# Patient Record
Sex: FEMALE | Race: WHITE | NOT HISPANIC OR LATINO | Employment: UNEMPLOYED | RURAL
[De-identification: names, ages, dates, MRNs, and addresses within clinical notes are randomized per-mention and may not be internally consistent; named-entity substitution may affect disease eponyms.]

---

## 2017-01-25 ENCOUNTER — OFFICE VISIT (OUTPATIENT)
Dept: FAMILY MEDICINE CLINIC | Facility: CLINIC | Age: 22
End: 2017-01-25

## 2017-01-25 VITALS
SYSTOLIC BLOOD PRESSURE: 118 MMHG | BODY MASS INDEX: 54.97 KG/M2 | WEIGHT: 280 LBS | RESPIRATION RATE: 16 BRPM | HEART RATE: 103 BPM | DIASTOLIC BLOOD PRESSURE: 64 MMHG | TEMPERATURE: 99.3 F | HEIGHT: 60 IN

## 2017-01-25 DIAGNOSIS — E66.01 MORBID OBESITY, UNSPECIFIED OBESITY TYPE (HCC): ICD-10-CM

## 2017-01-25 DIAGNOSIS — R73.03 PREDIABETES: ICD-10-CM

## 2017-01-25 DIAGNOSIS — E88.81 METABOLIC SYNDROME: ICD-10-CM

## 2017-01-25 DIAGNOSIS — M62.830 BACK SPASM: ICD-10-CM

## 2017-01-25 DIAGNOSIS — G47.9 SLEEP DISORDER: Primary | ICD-10-CM

## 2017-01-25 DIAGNOSIS — K21.00 GASTROESOPHAGEAL REFLUX DISEASE WITH ESOPHAGITIS: ICD-10-CM

## 2017-01-25 PROCEDURE — 99213 OFFICE O/P EST LOW 20 MIN: CPT | Performed by: FAMILY MEDICINE

## 2017-01-25 RX ORDER — CYCLOBENZAPRINE HCL 5 MG
5 TABLET ORAL 3 TIMES DAILY PRN
Qty: 90 TABLET | Refills: 3 | Status: SHIPPED | OUTPATIENT
Start: 2017-01-25 | End: 2017-11-07

## 2017-01-25 NOTE — MR AVS SNAPSHOT
Serenity Perry   1/25/2017 1:30 PM   Office Visit    Dept Phone:  [phone]   Encounter #:  [id number]    Provider:  Solo Lance MD   Department:  [facility]                Your Full Care Plan              Today's Medication Changes          These changes are accurate as of: 1/25/17  1:46 PM.  If you have any questions, ask your nurse or doctor.               New Medication(s)Ordered:     cyclobenzaprine 5 MG tablet   Commonly known as:  FLEXERIL   Take 1 tablet by mouth 3 (Three) Times a Day As Needed for muscle spasms.   Started by:  Solo Lance MD            Where to Get Your Medications      These medications were sent to [facility] [address] [place] [address] [address] [place] [address] AT SEC of [facility] & [place] - [phone]  - [phone]   [address] [facility] [address]     Phone:  [phone]     cyclobenzaprine 5 MG tablet                  Your Updated Medication List          This list is accurate as of: 1/25/17  1:46 PM.  Always use your most recent med list.                acyclovir 800 MG tablet   Commonly known as:  ZOVIRAX       cyclobenzaprine 5 MG tablet   Commonly known as:  FLEXERIL   Take 1 tablet by mouth 3 (Three) Times a Day As Needed for muscle spasms.       metFORMIN 500 MG tablet   Commonly known as:  GLUCOPHAGE   TAKE 1 PILL TIWCE A DAY       mupirocin 2 % ointment   Commonly known as:  BACTROBAN   Apply  topically 2 (Two) Times a Day.       pantoprazole 40 MG EC tablet   Commonly known as:  PROTONIX   TAKE BY MOUTH ONCE A DAY       raNITIdine 300 MG tablet   Commonly known as:  ZANTAC   TAKE ONCE A DAY       triamcinolone 0.5 % ointment   Commonly known as:  KENALOG   Apply  topically 2 (Two) Times a Day.       vitamin D 55619 UNITS capsule capsule   Commonly known as:  ERGOCALCIFEROL   Take 1 capsule by mouth 1 (One) Time Per Week.               We  Performed the Following     Ambulatory Referral to Sleep Medicine       You Were Diagnosed With        Codes Comments    Sleep disorder    -  Primary ICD-10-CM: G47.9  ICD-9-CM: 780.50       Medications to be Given to You by a Medical Professional     Due       Frequency    12/12/2016 mupirocin (BACTROBAN) 2 % ointment  Every 12 Hours Scheduled      Instructions    Probiotic - make sure 5-6 cultures/strains 1,000,000,000 cultures    Food Choices for Gastroesophageal Reflux Disease, Adult    Sumeet Bonner MD - EAT FAT GET THIN  10 day detox diet   When you have gastroesophageal reflux disease (GERD), the foods you eat and your eating habits are very important. Choosing the right foods can help ease your discomfort.   WHAT GUIDELINES DO I NEED TO FOLLOW?   · Choose fruits, vegetables, whole grains, and low-fat dairy products.    · Choose low-fat meat, fish, and poultry.  · Limit fats such as oils, salad dressings, butter, nuts, and avocado.    · Keep a food diary. This helps you identify foods that cause symptoms.    · Avoid foods that cause symptoms. These may be different for everyone.    · Eat small meals often instead of 3 large meals a day.    · Eat your meals slowly, in a place where you are relaxed.    · Limit fried foods.    · Cook foods using methods other than frying.    · Avoid drinking alcohol.    · Avoid drinking large amounts of liquids with your meals.    · Avoid bending over or lying down until 2-3 hours after eating.    WHAT FOODS ARE NOT RECOMMENDED?   These are some foods and drinks that may make your symptoms worse:  Vegetables  Tomatoes. Tomato juice. Tomato and spaghetti sauce. Chili peppers. Onion and garlic. Horseradish.  Fruits  Oranges, grapefruit, and lemon (fruit and juice).  Meats  High-fat meats, fish, and poultry. This includes hot dogs, ribs, ham, sausage, salami, and palacio.  Dairy  Whole milk and chocolate milk. Sour cream. Cream. Butter. Ice cream. Cream cheese.   Drinks  Coffee and tea.  Bubbly (carbonated) drinks or energy drinks.  Condiments  Hot sauce. Barbecue sauce.   Sweets/Desserts  Chocolate and cocoa. Donuts. Peppermint and spearmint.  Fats and Oils  High-fat foods. This includes French fries and potato chips.  Other  Vinegar. Strong spices. This includes black pepper, white pepper, red pepper, cayenne, barrett powder, cloves, ginger, and chili powder.  The items listed above may not be a complete list of foods and drinks to avoid. Contact your dietitian for more information.     This information is not intended to replace advice given to you by your health care provider. Make sure you discuss any questions you have with your health care provider.     Document Released: 06/18/2013 Document Revised: 01/08/2016 Document Reviewed: 10/22/2014  [employer] Interactive Patient Education ©2016 [employer] Inc.    Gastritis, Adult  Gastritis is soreness and swelling (inflammation) of the lining of the stomach. Gastritis can develop as a sudden onset (acute) or long-term (chronic) condition. If gastritis is not treated, it can lead to stomach bleeding and ulcers.  CAUSES   Gastritis occurs when the stomach lining is weak or damaged. Digestive juices from the stomach then inflame the weakened stomach lining. The stomach lining may be weak or damaged due to viral or bacterial infections. One common bacterial infection is the Helicobacter pylori infection. Gastritis can also result from excessive alcohol consumption, taking certain medicines, or having too much acid in the stomach.   SYMPTOMS   In some cases, there are no symptoms. When symptoms are present, they may include:  · Pain or a burning sensation in the upper abdomen.  · Nausea.  · Vomiting.  · An uncomfortable feeling of fullness after eating.  DIAGNOSIS   Your caregiver may suspect you have gastritis based on your symptoms and a physical exam. To determine the cause of your gastritis, your caregiver may perform the following:  · Blood or stool  tests to check for the H pylori bacterium.  · Gastroscopy. A thin, flexible tube (endoscope) is passed down the esophagus and into the stomach. The endoscope has a light and camera on the end. Your caregiver uses the endoscope to view the inside of the stomach.  · Taking a tissue sample (biopsy) from the stomach to examine under a microscope.  TREATMENT   Depending on the cause of your gastritis, medicines may be prescribed. If you have a bacterial infection, such as an H pylori infection, antibiotics may be given. If your gastritis is caused by too much acid in the stomach, H2 blockers or antacids may be given. Your caregiver may recommend that you stop taking aspirin, ibuprofen, or other nonsteroidal anti-inflammatory drugs (NSAIDs).  HOME CARE INSTRUCTIONS  · Only take over-the-counter or prescription medicines as directed by your caregiver.  · If you were given antibiotic medicines, take them as directed. Finish them even if you start to feel better.  · Drink enough fluids to keep your urine clear or pale yellow.  · Avoid foods and drinks that make your symptoms worse, such as:    Caffeine or alcoholic drinks.    Chocolate.    Peppermint or mint flavorings.    Garlic and onions.    Spicy foods.    Citrus fruits, such as oranges, mera, or limes.    Tomato-based foods such as sauce, chili, salsa, and pizza.    Fried and fatty foods.  · Eat small, frequent meals instead of large meals.  SEEK IMMEDIATE MEDICAL CARE IF:   · You have black or dark red stools.  · You vomit blood or material that looks like coffee grounds.  · You are unable to keep fluids down.  · Your abdominal pain gets worse.  · You have a fever.  · You do not feel better after 1 week.  · You have any other questions or concerns.  MAKE SURE YOU:  · Understand these instructions.  · Will watch your condition.  · Will get help right away if you are not doing well or get worse.     This information is not intended to replace advice given to you by  your health care provider. Make sure you discuss any questions you have with your health care provider.     Document Released: 12/12/2002 Document Revised: 06/18/2013 Document Reviewed: 01/30/2013  [employer] Interactive Patient Education ©2016 [employer]  Cyclobenzaprine tablets  What is this medicine?  CYCLOBENZAPRINE (sye kloe JUSTINO za preen) is a muscle relaxer. It is used to treat muscle pain, spasms, and stiffness.  This medicine may be used for other purposes; ask your health care provider or pharmacist if you have questions.  What should I tell my health care provider before I take this medicine?  They need to know if you have any of these conditions:  -heart disease, irregular heartbeat, or previous heart attack  -liver disease  -thyroid problem  -an unusual or allergic reaction to cyclobenzaprine, tricyclic antidepressants, lactose, other medicines, foods, dyes, or preservatives  -pregnant or trying to get pregnant  -breast-feeding  How should I use this medicine?  Take this medicine by mouth with a glass of water. Follow the directions on the prescription label. If this medicine upsets your stomach, take it with food or milk. Take your medicine at regular intervals. Do not take it more often than directed.  Talk to your pediatrician regarding the use of this medicine in children. Special care may be needed.  Overdosage: If you think you have taken too much of this medicine contact a poison control center or emergency room at once.  NOTE: This medicine is only for you. Do not share this medicine with others.  What if I miss a dose?  If you miss a dose, take it as soon as you can. If it is almost time for your next dose, take only that dose. Do not take double or extra doses.  What may interact with this medicine?  Do not take this medicine with any of the following medications:  -certain medicines for fungal infections like fluconazole, itraconazole, ketoconazole, posaconazole,  voriconazole  -cisapride  -dofetilide  -dronedarone  -droperidol  -flecainide  -grepafloxacin  -halofantrine  -levomethadyl  -MAOIs like Carbex, Eldepryl, Marplan, Nardil, and Parnate  -nilotinib  -pimozide  -probucol  -sertindole  -thioridazine  -ziprasidone  This medicine may also interact with the following medications:  -abarelix  -alcohol  -certain medicines for cancer  -certain medicines for depression, anxiety, or psychotic disturbances  -certain medicines for infection like alfuzosin, chloroquine, clarithromycin, levofloxacin, mefloquine, pentamidine, troleandomycin  -certain medicines for an irregular heart beat  -certain medicines used for sleep or numbness during surgery or procedure  -contrast dyes  -dolasetron  -guanethidine  -methadone  -octreotide  -ondansetron  -other medicines that prolong the QT interval (cause an abnormal heart rhythm)  -palonosetron  -phenothiazines like chlorpromazine, mesoridazine, prochlorperazine, thioridazine  -tramadol  -vardenafil  This list may not describe all possible interactions. Give your health care provider a list of all the medicines, herbs, non-prescription drugs, or dietary supplements you use. Also tell them if you smoke, drink alcohol, or use illegal drugs. Some items may interact with your medicine.  What should I watch for while using this medicine?  Check with your doctor or health care professional if your condition does not improve within 1 to 3 weeks.  You may get drowsy or dizzy when you first start taking the medicine or change doses. Do not drive, use machinery, or do anything that may be dangerous until you know how the medicine affects you. Stand or sit up slowly.  Your mouth may get dry. Drinking water, chewing sugarless gum, or sucking on hard candy may help.  What side effects may I notice from receiving this medicine?  Side effects that you should report to your doctor or health care professional as soon as possible:  -allergic reactions like  skin rash, itching or hives, swelling of the face, lips, or tongue  -chest pain  -fast heartbeat  -hallucinations  -seizures  -vomiting  Side effects that usually do not require medical attention (report to your doctor or health care professional if they continue or are bothersome):  -headache  This list may not describe all possible side effects. Call your doctor for medical advice about side effects. You may report side effects to FDA at 9-417-MCW-7574.  Where should I keep my medicine?  Keep out of the reach of children.  Store at room temperature between 15 and 30 degrees C (59 and 86 degrees F). Keep container tightly closed. Throw away any unused medicine after the expiration date.  NOTE: This sheet is a summary. It may not cover all possible information. If you have questions about this medicine, talk to your doctor, pharmacist, or health care provider.     © 2016, Elsevier/[employer]. (2014-07-15 12:48:19)       Patient Instructions History      Upcoming Appointments     Visit Type Date Time Department    OFFICE VISIT 1/25/2017  1:30 PM [facility]    OFFICE VISIT 3/13/2017 11:00 AM [facility]      [employer] Signup     Our records indicate that you have declined [employer] signup. If you would like to sign up for [employer], please email [email or web address] or call [phone] to obtain an activation code.             Other Info from Your Visit           Your Appointments     Mar 13, 2017 11:00 AM CDT   Office Visit with Solo Lance MD   [facility] (--)    [place] [address] [address] [id number]  [facility] [id number] [phone]           Arrive 15 minutes prior to appointment.              Allergies     No Known Allergies      Reason for Visit     Sleep Apnea           Vital Signs     Blood Pressure Pulse Temperature Respirations Height Weight    118/64 103 99.3 °F (37.4 °C) 16  "60\" (152.4 cm) 280 lb (127 kg)    Body Mass Index Smoking Status                54.68 kg/m2 Never Smoker          Problems and Diagnoses Noted     Sleep disorder    -  Primary        "

## 2017-01-25 NOTE — PATIENT INSTRUCTIONS
Probiotic - make sure 5-6 cultures/strains 1,000,000,000 cultures    Food Choices for Gastroesophageal Reflux Disease, Adult    Sumeet Bonner MD - EAT FAT GET THIN  10 day detox diet   When you have gastroesophageal reflux disease (GERD), the foods you eat and your eating habits are very important. Choosing the right foods can help ease your discomfort.   WHAT GUIDELINES DO I NEED TO FOLLOW?   · Choose fruits, vegetables, whole grains, and low-fat dairy products.    · Choose low-fat meat, fish, and poultry.  · Limit fats such as oils, salad dressings, butter, nuts, and avocado.    · Keep a food diary. This helps you identify foods that cause symptoms.    · Avoid foods that cause symptoms. These may be different for everyone.    · Eat small meals often instead of 3 large meals a day.    · Eat your meals slowly, in a place where you are relaxed.    · Limit fried foods.    · Cook foods using methods other than frying.    · Avoid drinking alcohol.    · Avoid drinking large amounts of liquids with your meals.    · Avoid bending over or lying down until 2-3 hours after eating.    WHAT FOODS ARE NOT RECOMMENDED?   These are some foods and drinks that may make your symptoms worse:  Vegetables  Tomatoes. Tomato juice. Tomato and spaghetti sauce. Chili peppers. Onion and garlic. Horseradish.  Fruits  Oranges, grapefruit, and lemon (fruit and juice).  Meats  High-fat meats, fish, and poultry. This includes hot dogs, ribs, ham, sausage, salami, and palacio.  Dairy  Whole milk and chocolate milk. Sour cream. Cream. Butter. Ice cream. Cream cheese.   Drinks  Coffee and tea. Bubbly (carbonated) drinks or energy drinks.  Condiments  Hot sauce. Barbecue sauce.   Sweets/Desserts  Chocolate and cocoa. Donuts. Peppermint and spearmint.  Fats and Oils  High-fat foods. This includes French fries and potato chips.  Other  Vinegar. Strong spices. This includes black pepper, white pepper, red pepper, cayenne, barrett powder, cloves, ginger, and  chili powder.  The items listed above may not be a complete list of foods and drinks to avoid. Contact your dietitian for more information.     This information is not intended to replace advice given to you by your health care provider. Make sure you discuss any questions you have with your health care provider.     Document Released: 06/18/2013 Document Revised: 01/08/2016 Document Reviewed: 10/22/2014  [employer] Interactive Patient Education ©2016 [employer].    Gastritis, Adult  Gastritis is soreness and swelling (inflammation) of the lining of the stomach. Gastritis can develop as a sudden onset (acute) or long-term (chronic) condition. If gastritis is not treated, it can lead to stomach bleeding and ulcers.  CAUSES   Gastritis occurs when the stomach lining is weak or damaged. Digestive juices from the stomach then inflame the weakened stomach lining. The stomach lining may be weak or damaged due to viral or bacterial infections. One common bacterial infection is the Helicobacter pylori infection. Gastritis can also result from excessive alcohol consumption, taking certain medicines, or having too much acid in the stomach.   SYMPTOMS   In some cases, there are no symptoms. When symptoms are present, they may include:  · Pain or a burning sensation in the upper abdomen.  · Nausea.  · Vomiting.  · An uncomfortable feeling of fullness after eating.  DIAGNOSIS   Your caregiver may suspect you have gastritis based on your symptoms and a physical exam. To determine the cause of your gastritis, your caregiver may perform the following:  · Blood or stool tests to check for the H pylori bacterium.  · Gastroscopy. A thin, flexible tube (endoscope) is passed down the esophagus and into the stomach. The endoscope has a light and camera on the end. Your caregiver uses the endoscope to view the inside of the stomach.  · Taking a tissue sample (biopsy) from the stomach to examine under a microscope.  TREATMENT   Depending  on the cause of your gastritis, medicines may be prescribed. If you have a bacterial infection, such as an H pylori infection, antibiotics may be given. If your gastritis is caused by too much acid in the stomach, H2 blockers or antacids may be given. Your caregiver may recommend that you stop taking aspirin, ibuprofen, or other nonsteroidal anti-inflammatory drugs (NSAIDs).  HOME CARE INSTRUCTIONS  · Only take over-the-counter or prescription medicines as directed by your caregiver.  · If you were given antibiotic medicines, take them as directed. Finish them even if you start to feel better.  · Drink enough fluids to keep your urine clear or pale yellow.  · Avoid foods and drinks that make your symptoms worse, such as:    Caffeine or alcoholic drinks.    Chocolate.    Peppermint or mint flavorings.    Garlic and onions.    Spicy foods.    Citrus fruits, such as oranges, mera, or limes.    Tomato-based foods such as sauce, chili, salsa, and pizza.    Fried and fatty foods.  · Eat small, frequent meals instead of large meals.  SEEK IMMEDIATE MEDICAL CARE IF:   · You have black or dark red stools.  · You vomit blood or material that looks like coffee grounds.  · You are unable to keep fluids down.  · Your abdominal pain gets worse.  · You have a fever.  · You do not feel better after 1 week.  · You have any other questions or concerns.  MAKE SURE YOU:  · Understand these instructions.  · Will watch your condition.  · Will get help right away if you are not doing well or get worse.     This information is not intended to replace advice given to you by your health care provider. Make sure you discuss any questions you have with your health care provider.     Document Released: 12/12/2002 Document Revised: 06/18/2013 Document Reviewed: 01/30/2013  [employer] Interactive Patient Education ©2016 [employer]  Cyclobenzaprine tablets  What is this medicine?  CYCLOBENZAPRINE (sye kloe JUSTINO za preen) is a muscle relaxer. It  is used to treat muscle pain, spasms, and stiffness.  This medicine may be used for other purposes; ask your health care provider or pharmacist if you have questions.  What should I tell my health care provider before I take this medicine?  They need to know if you have any of these conditions:  -heart disease, irregular heartbeat, or previous heart attack  -liver disease  -thyroid problem  -an unusual or allergic reaction to cyclobenzaprine, tricyclic antidepressants, lactose, other medicines, foods, dyes, or preservatives  -pregnant or trying to get pregnant  -breast-feeding  How should I use this medicine?  Take this medicine by mouth with a glass of water. Follow the directions on the prescription label. If this medicine upsets your stomach, take it with food or milk. Take your medicine at regular intervals. Do not take it more often than directed.  Talk to your pediatrician regarding the use of this medicine in children. Special care may be needed.  Overdosage: If you think you have taken too much of this medicine contact a poison control center or emergency room at once.  NOTE: This medicine is only for you. Do not share this medicine with others.  What if I miss a dose?  If you miss a dose, take it as soon as you can. If it is almost time for your next dose, take only that dose. Do not take double or extra doses.  What may interact with this medicine?  Do not take this medicine with any of the following medications:  -certain medicines for fungal infections like fluconazole, itraconazole, ketoconazole, posaconazole, voriconazole  -cisapride  -dofetilide  -dronedarone  -droperidol  -flecainide  -grepafloxacin  -halofantrine  -levomethadyl  -MAOIs like Carbex, Eldepryl, Marplan, Nardil, and Parnate  -nilotinib  -pimozide  -probucol  -sertindole  -thioridazine  -ziprasidone  This medicine may also interact with the following medications:  -abarelix  -alcohol  -certain medicines for cancer  -certain medicines for  depression, anxiety, or psychotic disturbances  -certain medicines for infection like alfuzosin, chloroquine, clarithromycin, levofloxacin, mefloquine, pentamidine, troleandomycin  -certain medicines for an irregular heart beat  -certain medicines used for sleep or numbness during surgery or procedure  -contrast dyes  -dolasetron  -guanethidine  -methadone  -octreotide  -ondansetron  -other medicines that prolong the QT interval (cause an abnormal heart rhythm)  -palonosetron  -phenothiazines like chlorpromazine, mesoridazine, prochlorperazine, thioridazine  -tramadol  -vardenafil  This list may not describe all possible interactions. Give your health care provider a list of all the medicines, herbs, non-prescription drugs, or dietary supplements you use. Also tell them if you smoke, drink alcohol, or use illegal drugs. Some items may interact with your medicine.  What should I watch for while using this medicine?  Check with your doctor or health care professional if your condition does not improve within 1 to 3 weeks.  You may get drowsy or dizzy when you first start taking the medicine or change doses. Do not drive, use machinery, or do anything that may be dangerous until you know how the medicine affects you. Stand or sit up slowly.  Your mouth may get dry. Drinking water, chewing sugarless gum, or sucking on hard candy may help.  What side effects may I notice from receiving this medicine?  Side effects that you should report to your doctor or health care professional as soon as possible:  -allergic reactions like skin rash, itching or hives, swelling of the face, lips, or tongue  -chest pain  -fast heartbeat  -hallucinations  -seizures  -vomiting  Side effects that usually do not require medical attention (report to your doctor or health care professional if they continue or are bothersome):  -headache  This list may not describe all possible side effects. Call your doctor for medical advice about side  effects. You may report side effects to FDA at 4-543-QYS-5574.  Where should I keep my medicine?  Keep out of the reach of children.  Store at room temperature between 15 and 30 degrees C (59 and 86 degrees F). Keep container tightly closed. Throw away any unused medicine after the expiration date.  NOTE: This sheet is a summary. It may not cover all possible information. If you have questions about this medicine, talk to your doctor, pharmacist, or health care provider.     © 2016, [employer]vier/[employer]. (2014-07-15 12:48:19)

## 2017-01-25 NOTE — PROGRESS NOTES
"Subjective   Serenity Perry is a 21 y.o. female.     History of Present Illness     Problem List  1. Morbid obesity  2. Prediabetes  3. Pruritic Rash on abdomen and arms  4. Vitamin D deficiency  5. Hyperinsular obesity  6. GERD  7. Esophagitis   8. Anxiety     Pt is 22 yo WF who is here for folllowup. Today is concerned about possible obstructive sleep apnea.  Pt is requesting to get a sleep study done. Grandmother was recently diagnosed with sleep apnea.  Pt  States she often wakes up fatigued and not getting a good night rest    Has been having pain in right upper back for about a week now. Has some muscle spasms and tenderness. Denies any chest pain but does hurt sometimes in back when she breaths.  No fever , coughing.  Pain about 3/10 on severity    Also on Metformin for prediabetes and hyperinsular obesity. Has difficult time taking medication on time and as scheduled    Regarding esophagitis. Is currently on protonix and it does help.  Is wanting information on foods to help ease reflux and esophagitis     The following portions of the patient's history were reviewed and updated as appropriate: allergies, current medications, past family history, past medical history, past social history, past surgical history and problem list.    Review of Systems   Constitutional: Negative.    HENT: Negative.    Eyes: Negative.    Respiratory: Negative.    Cardiovascular: Negative.    Gastrointestinal: Positive for abdominal pain.   Endocrine: Negative.    Genitourinary: Negative.    Musculoskeletal: Positive for back pain.   Skin: Negative.    Allergic/Immunologic: Negative.    Neurological: Negative.    Hematological: Negative.    Psychiatric/Behavioral: Positive for sleep disturbance.       Objective    Visit Vitals   • /64   • Pulse 103   • Temp 99.3 °F (37.4 °C)   • Resp 16   • Ht 60\" (152.4 cm)   • Wt 280 lb (127 kg)   • BMI 54.68 kg/m2         Chemistry        Component Value Date/Time     " 10/26/2016 0947    K 4.7 10/26/2016 0947    CL 99 10/26/2016 0947    CO2 33 (H) 10/26/2016 0947    BUN 15 10/26/2016 0947    CREATININE 0.7 10/26/2016 0947        Component Value Date/Time    CALCIUM 9.2 10/26/2016 0947    ALKPHOS 61 10/26/2016 0947    AST 24 10/26/2016 0947    ALT 32 10/26/2016 0947    BILITOT 0.4 10/26/2016 0947        Lab Results   Component Value Date    WBC 9.3 10/26/2016    HGB 13.9 10/26/2016    HCT 41.3 10/26/2016    MCV 90.6 10/26/2016     10/26/2016     Lab Results   Component Value Date    HGBA1C 5.9 (H) 10/26/2016     No results found for: CHOL  Lab Results   Component Value Date    TRIG 94 10/26/2016    TRIG 101 01/05/2016     Lab Results   Component Value Date    HDL 38 (L) 10/26/2016    HDL 34 (L) 01/05/2016     Lab Results   Component Value Date    LDLCALC 91 10/26/2016    LDLCALC 89 01/05/2016     No results found for: LDL  No results found for: HDLLDLRATIO  No components found for: CHOLHDL   Lab Results   Component Value Date    TSH 3.00 10/26/2016     Physical Exam   Constitutional: She is oriented to person, place, and time. She appears well-developed and well-nourished. No distress.   HENT:   Head: Normocephalic and atraumatic.   Right Ear: External ear normal.   Left Ear: External ear normal.   Eyes: Conjunctivae and EOM are normal. Pupils are equal, round, and reactive to light. Right eye exhibits no discharge. Left eye exhibits no discharge. No scleral icterus.   Neck: Normal range of motion. Neck supple. No JVD present. No tracheal deviation present. No thyromegaly present.   Cardiovascular: Normal rate, regular rhythm and normal heart sounds.    Pulmonary/Chest: Effort normal and breath sounds normal. No stridor. No respiratory distress. She has no wheezes.   Abdominal: Soft. Bowel sounds are normal. She exhibits no distension and no mass. There is no tenderness. There is no rebound and no guarding. No hernia.   Obese abdomen    Musculoskeletal: Normal range of  motion. She exhibits tenderness. She exhibits no edema or deformity.        Arms:  Lymphadenopathy:     She has no cervical adenopathy.   Neurological: She is alert and oriented to person, place, and time. She has normal reflexes. No cranial nerve deficit. Coordination normal.   Skin: Skin is warm and dry. No rash noted. She is not diaphoretic. No erythema. No pallor.   Psychiatric: She has a normal mood and affect. Her behavior is normal. Judgment and thought content normal.   Nursing note and vitals reviewed.      Assessment/Plan   Problems Addressed this Visit        Digestive    Gastroesophageal reflux disease with esophagitis    Morbid obesity       Musculoskeletal and Integument    Back spasm       Other    Prediabetes    Metabolic syndrome    Sleep disorder - Primary    Relevant Orders    Ambulatory Referral to Sleep Medicine        - for morbid obesity - counseled on weight loss diet and exercise  - prediabetes /hyperinsular obesity - pt to continue with metformin PO BID  - for sleep issues - will set up appt with Dr. Dykes (Pulmonologist) for sleep study. Consultation appreciated  - GERD/esophagitis-  Recommended and gave information about foods to eat.  Advised to refrain from caffeine, spicy foods, chocolate  - recheck in 3 months or earlier if back pain is not getting any better Consider back or chest x-ray

## 2017-11-07 ENCOUNTER — CLINICAL SUPPORT (OUTPATIENT)
Dept: AUDIOLOGY | Facility: CLINIC | Age: 22
End: 2017-11-07

## 2017-11-07 ENCOUNTER — OFFICE VISIT (OUTPATIENT)
Dept: OTOLARYNGOLOGY | Facility: CLINIC | Age: 22
End: 2017-11-07

## 2017-11-07 VITALS — HEIGHT: 59 IN | BODY MASS INDEX: 53.83 KG/M2 | WEIGHT: 267 LBS | HEART RATE: 93 BPM | OXYGEN SATURATION: 98 %

## 2017-11-07 DIAGNOSIS — G44.209 TENSION HEADACHE: ICD-10-CM

## 2017-11-07 DIAGNOSIS — Z01.118 ENCOUNTER FOR EXAMINATION OF HEARING WITH ABNORMAL FINDINGS: Primary | ICD-10-CM

## 2017-11-07 DIAGNOSIS — R09.89 VENOUS HUM: Primary | ICD-10-CM

## 2017-11-07 PROCEDURE — 99243 OFF/OP CNSLTJ NEW/EST LOW 30: CPT | Performed by: OTOLARYNGOLOGY

## 2017-11-07 RX ORDER — IBUPROFEN 800 MG/1
800 TABLET ORAL AS NEEDED
COMMUNITY

## 2017-11-07 RX ORDER — FLUTICASONE PROPIONATE 50 MCG
SPRAY, SUSPENSION (ML) NASAL
Refills: 0 | COMMUNITY
Start: 2017-10-12

## 2017-11-07 RX ORDER — NAPROXEN 500 MG/1
TABLET ORAL
Refills: 0 | COMMUNITY
Start: 2017-10-24

## 2017-11-07 RX ORDER — CETIRIZINE HYDROCHLORIDE 10 MG/1
TABLET ORAL
Refills: 0 | COMMUNITY
Start: 2017-10-12

## 2017-11-07 RX ORDER — TIZANIDINE HYDROCHLORIDE 4 MG/1
CAPSULE, GELATIN COATED ORAL
Qty: 20 CAPSULE | Refills: 0 | Status: SHIPPED | OUTPATIENT
Start: 2017-11-07

## 2017-11-07 NOTE — PROGRESS NOTES
STANDARD AUDIOMETRIC EVALUATION      Name:  Serenity Perry  :  1995  Age:  22 y.o.  Date of Evaluation:  2017      HISTORY    Reason for visit:  Serenity Perry is seen today for a hearing evaluation at the request of Dr. Олег Gaviria.  Patient reports she has a history of fluid in her ears for which she has been taking allergy medicine.  She states the fluid in her ears affects her motion.  She also states she hears blood rushing in both of her ears, and she gets headaches.  She states she has history of ear infections as a child, but she never had tubes in her ears.        EVALUATION    See Audiogram    RESULTS        Otoscopy and Tympanometry 226 Hz :  Right Ear:  Otoscopy:  Clear ear canal          Tympanometry:  Middle ear function within normal limits    Left Ear:   Otoscopy:  Clear ear canal        Tympanometry:  Middle ear function within normal limits    Test technique:  Standard Audiometry     Pure Tone Audiometry:   Patient responded to pure tones at 5-35 dB for 250-8000 Hz in right ear, and at 5-30 dB for 250-8000 Hz in left ear.       Speech Audiometry:        Right Ear:  Speech Reception Threshold (SRT) was obtained at 15 dBHL                 Speech Discrimination scores were 100% in quiet when words were presented at 55 dBHL       Left Ear:  Speech Reception Threshold (SRT) was obtained at 15 dBHL                 Speech Discrimination scores were 100% in quiet when words were presented at 55 dBHL    Reliability:   good    IMPRESSIONS:  1.  Tympanometry results are consistent with Middle ear function within normal limits in both ears.  2.  Pure tone results are consistent with hearing sensitivity essentially within normal limits with mild drop at 250 Hz for both ears.       RECOMMENDATIONS:  Patient is seeing the Ear Nose and Throat physician immediately following this examination.  It was a pleasure seeing Serenity Perry in Audiology today.  We would be happy  to do further testing or discuss these test as necessary.          This document has been electronically signed by Lou Dennis MS CCC-ALEC on November 7, 2017 4:43 PM       Lou Dennis MS CCC-A  Licensed Audiologist

## 2017-11-08 NOTE — PROGRESS NOTES
"Subjective   Serenity Perry is a 22 y.o. female.   Is a consultation from Dr. Larry Adrian  History of Present Illness   Patient reports that for 2 months she has been hearing and noise in her ears that sounds like a \"whooshing\".  This is present all day but is worse in a quiet environment and worse when she lies down at night. Was diagnosed with fluid in her ear and given antihistamines.  Feels like her hearing was decreased previously but seems to be better.  No otorrhea.  Had a lot of ear infections as a child but did not have tubes.  Also has headaches that are occipital radiating into the trapezius area.  These are present on a daily basis.      The following portions of the patient's history were reviewed and updated as appropriate: allergies, current medications, past family history, past medical history, past social history, past surgical history and problem list.      Serenity Perry reports that she has never smoked. She has never used smokeless tobacco. She reports that she does not drink alcohol or use illicit drugs.  Patient is not a tobacco user and has not been counseled for use of tobacco products    Family History   Problem Relation Age of Onset   • Irritable bowel syndrome Maternal Grandmother    • Depression Other    • Diabetes Other    • Heart disease Other    • Ovarian cancer Other    • Stroke Other    • Kidney disease Mother    • Diabetes Mother    • Coronary artery disease Mother    • Heart disease Mother        No Known Allergies      Current Outpatient Prescriptions:   •  ibuprofen (ADVIL,MOTRIN) 800 MG tablet, Take 800 mg by mouth As Needed for Mild Pain ., Disp: , Rfl:   •  pantoprazole (PROTONIX) 40 MG EC tablet, TAKE BY MOUTH ONCE A DAY, Disp: 30 tablet, Rfl: 11  •  cetirizine (zyrTEC) 10 MG tablet, TK 1 T PO HS, Disp: , Rfl: 0  •  fluticasone (FLONASE) 50 MCG/ACT nasal spray, INSTILL 2 SPRAYS IN EACH NOSTRIL ONCE D, Disp: , Rfl: 0  •  naproxen (NAPROSYN) 500 MG tablet, " TK 1 T PO BID FOR 10 DAYS, Disp: , Rfl: 0  •  TiZANidine (ZANAFLEX) 4 MG capsule, 1 po bid prn tension headache, Disp: 20 capsule, Rfl: 0    Current Facility-Administered Medications:   •  mupirocin (BACTROBAN) 2 % ointment, , Topical, Q12H, Solo Lance MD    Past Medical History:   Diagnosis Date   • Abdominal pain     R   • Acute bilateral otitis media    • Acute bronchitis    • Acute sinusitis    • Epigastric pain    • Esophagitis    • Gastric hemorrhage due to erosive gastritis    • GERD (gastroesophageal reflux disease)    • Headache    • Heartburn    • Impaired glucose tolerance    • Internal hordeolum    • Morbid obesity    • Obesity    • Pain in left knee    • Palpitations    • Right upper quadrant pain          Review of Systems   Cardiovascular: Positive for palpitations.   Neurological: Positive for headaches.   All other systems reviewed and are negative.          Objective   Physical Exam  General: Well-developed well-nourished female in no acute distress.  Alert and oriented ×3. Head: Normocephalic. Face: Symmetrical strength and appearance. PERRL. EOMI. Voice:Strong. Speech:Fluent  Ears: External ears no deformity, canals no discharge, tympanic membranes intact clear and mobile bilaterally.  Nose: Nares show no discharge mass polyp or purulence.  Boggy mucosa is present.  No gross external deformity.  Septum: To the left  Oral cavity: Lips and gums without lesions.  Tongue and floor of mouth without lesions.  Parotid and submandibular ducts unobstructed.  No mucosal lesions on the buccal mucosa or vestibule of the mouth.  Pharynx: No erythema exudate mass or ulcer  Neck: No lymphadenopathy.  No thyromegaly.  Trachea and larynx midline.  No masses in the parotid or submandibular glands.  She is subjectively tender at the insertion of the trapezius to the occipital bilaterally.  General compression of the neck extinguishes the noise that she hears on the affected side, suggesting that this is  actually a venous hum.    Audiogram is obtained and reviewed and shows essentially normal hearing bilaterally with type A tympanograms.  Discrimination scores are 100% bilaterally.  There is a low-frequency tilt that is symmetrical bilateral that I do not think is indicative of clinically significant pathology.      Assessment/Plan   Serenity was seen today for otitis media.    Diagnoses and all orders for this visit:    Venous hum    Tension headache      Plan: Explained to the patient that the noise she is hearing in her ears is actually blood flow from her head toward the internal jugular vein.  There is no specific treatment for this.  I've suggested using masking noise as needed along with elevating the head of bed to try to minimize her symptoms when supine.  I have reassured her that her headaches are not related to her ears.  I will give her a trial prescription of Zanaflex 4 mg by mouth twice a day as needed #20 with no refill.  I told her if this was effective she should discuss long term management of these headaches with Dr. Adrian .  She may return in a year for reevaluation or sooner if needed for worsening symptoms.    My thanks to Dr Adrian for this consultation

## 2017-12-11 RX ORDER — PANTOPRAZOLE SODIUM 40 MG/1
TABLET, DELAYED RELEASE ORAL
Qty: 30 TABLET | Refills: 0 | Status: SHIPPED | OUTPATIENT
Start: 2017-12-11